# Patient Record
Sex: FEMALE | Race: ASIAN | Employment: UNEMPLOYED | URBAN - METROPOLITAN AREA
[De-identification: names, ages, dates, MRNs, and addresses within clinical notes are randomized per-mention and may not be internally consistent; named-entity substitution may affect disease eponyms.]

---

## 2024-07-07 ENCOUNTER — OFFICE VISIT (OUTPATIENT)
Dept: URGENT CARE | Facility: CLINIC | Age: 54
End: 2024-07-07
Payer: COMMERCIAL

## 2024-07-07 VITALS
DIASTOLIC BLOOD PRESSURE: 85 MMHG | OXYGEN SATURATION: 94 % | TEMPERATURE: 98.4 F | HEART RATE: 68 BPM | RESPIRATION RATE: 16 BRPM | SYSTOLIC BLOOD PRESSURE: 156 MMHG | WEIGHT: 143 LBS

## 2024-07-07 DIAGNOSIS — J02.9 ACUTE SORE THROAT: ICD-10-CM

## 2024-07-07 DIAGNOSIS — K12.0 APHTHOUS ULCER OF MOUTH: Primary | ICD-10-CM

## 2024-07-07 PROBLEM — R42 DIZZINESS: Status: ACTIVE | Noted: 2022-11-18

## 2024-07-07 PROBLEM — F41.9 ANXIETY: Status: ACTIVE | Noted: 2023-06-05

## 2024-07-07 PROBLEM — D18.01 HEMANGIOMA OF SKIN AND SUBCUTANEOUS TISSUE: Status: ACTIVE | Noted: 2021-08-31

## 2024-07-07 PROBLEM — E56.9 VITAMIN DEFICIENCY: Status: ACTIVE | Noted: 2021-08-04

## 2024-07-07 PROBLEM — M62.838 MUSCLE SPASM OF LEFT SHOULDER: Status: RESOLVED | Noted: 2024-07-07 | Resolved: 2024-07-07

## 2024-07-07 PROBLEM — M25.50 ARTHRALGIA: Status: ACTIVE | Noted: 2024-07-07

## 2024-07-07 PROBLEM — N90.7 LABIAL CYST: Status: RESOLVED | Noted: 2021-07-11 | Resolved: 2024-07-07

## 2024-07-07 PROBLEM — E03.9 HYPOTHYROIDISM: Status: ACTIVE | Noted: 2019-10-17

## 2024-07-07 PROBLEM — D22.5 MELANOCYTIC NEVI OF TRUNK: Status: ACTIVE | Noted: 2021-08-31

## 2024-07-07 PROBLEM — D48.5 NEOPLASM OF UNCERTAIN BEHAVIOR OF SKIN: Status: ACTIVE | Noted: 2021-08-31

## 2024-07-07 PROBLEM — G44.201 ACUTE INTRACTABLE TENSION-TYPE HEADACHE: Status: RESOLVED | Noted: 2024-07-07 | Resolved: 2024-07-07

## 2024-07-07 PROBLEM — R07.81 RIB PAIN: Status: ACTIVE | Noted: 2021-08-22

## 2024-07-07 PROBLEM — K21.9 GASTROESOPHAGEAL REFLUX DISEASE WITHOUT ESOPHAGITIS: Status: ACTIVE | Noted: 2018-01-03

## 2024-07-07 PROBLEM — M25.511 ACUTE PAIN OF RIGHT SHOULDER: Status: RESOLVED | Noted: 2021-08-22 | Resolved: 2024-07-07

## 2024-07-07 PROBLEM — M79.661 BILATERAL CALF PAIN: Status: ACTIVE | Noted: 2024-07-07

## 2024-07-07 PROBLEM — I10 ESSENTIAL HYPERTENSION: Status: ACTIVE | Noted: 2021-08-04

## 2024-07-07 PROBLEM — L82.1 OTHER SEBORRHEIC KERATOSIS: Status: ACTIVE | Noted: 2021-08-31

## 2024-07-07 PROBLEM — R59.9 REACTIVE LYMPHADENOPATHY: Status: ACTIVE | Noted: 2018-04-03
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
## 2024-07-07 PROBLEM — L81.4 OTHER MELANIN HYPERPIGMENTATION: Status: ACTIVE | Noted: 2021-08-31

## 2024-07-07 PROBLEM — M79.662 BILATERAL CALF PAIN: Status: ACTIVE | Noted: 2024-07-07

## 2024-07-07 PROCEDURE — 3079F DIAST BP 80-89 MM HG: CPT

## 2024-07-07 PROCEDURE — 3077F SYST BP >= 140 MM HG: CPT

## 2024-07-07 PROCEDURE — 99213 OFFICE O/P EST LOW 20 MIN: CPT

## 2024-07-07 RX ORDER — OMEPRAZOLE 20 MG/1
20 CAPSULE, DELAYED RELEASE ORAL
COMMUNITY

## 2024-07-07 RX ORDER — FERROUS SULFATE 325(65) MG
1 TABLET ORAL
COMMUNITY
Start: 2023-06-05

## 2024-07-07 RX ORDER — DICLOFENAC SODIUM 10 MG/G
2 GEL TOPICAL 2 TIMES DAILY
COMMUNITY
Start: 2023-06-05

## 2024-07-07 RX ORDER — CALCIUM CARBONATE 500(1250)
TABLET ORAL
COMMUNITY
Start: 2024-07-07

## 2024-07-07 RX ORDER — HYDROXYZINE HYDROCHLORIDE 25 MG/1
TABLET, FILM COATED ORAL
COMMUNITY
Start: 2023-03-23

## 2024-07-07 RX ORDER — LIDOCAINE HYDROCHLORIDE 20 MG/ML
SOLUTION OROPHARYNGEAL
Qty: 100 ML | Refills: 0 | Status: SHIPPED | OUTPATIENT
Start: 2024-07-07

## 2024-07-07 RX ORDER — DEXAMETHASONE 0.5 MG/5ML
ELIXIR ORAL
Qty: 100 ML | Refills: 0 | Status: SHIPPED | OUTPATIENT
Start: 2024-07-07

## 2024-07-07 RX ORDER — LOSARTAN POTASSIUM 50 MG/1
50 TABLET ORAL DAILY
COMMUNITY

## 2024-07-07 RX ORDER — TIZANIDINE 4 MG/1
TABLET ORAL EVERY 6 HOURS
COMMUNITY
Start: 2022-08-29

## 2024-07-07 RX ORDER — ESCITALOPRAM OXALATE 10 MG/1
10 TABLET ORAL NIGHTLY
COMMUNITY
Start: 2023-11-30

## 2024-07-07 RX ORDER — IBUPROFEN 200 MG
1 TABLET ORAL
COMMUNITY
Start: 2023-06-05

## 2024-07-07 RX ORDER — LEVOTHYROXINE SODIUM 50 UG/1
50 TABLET ORAL
COMMUNITY
Start: 2024-05-14 | End: 2025-05-14

## 2024-07-07 RX ORDER — CLOTRIMAZOLE 10 MG/1
LOZENGE ORAL
Qty: 50 TABLET | Refills: 0 | Status: SHIPPED | OUTPATIENT
Start: 2024-07-07

## 2024-07-07 RX ORDER — ERGOCALCIFEROL 1.25 MG/1
1 CAPSULE ORAL
COMMUNITY
Start: 2023-06-05

## 2024-07-07 RX ORDER — DEXTROMETHORPHAN HYDROBROMIDE, GUAIFENESIN 5; 100 MG/5ML; MG/5ML
LIQUID ORAL
COMMUNITY
Start: 2023-03-13

## 2024-07-07 RX ORDER — LOSARTAN POTASSIUM AND HYDROCHLOROTHIAZIDE 12.5; 5 MG/1; MG/1
1 TABLET ORAL
COMMUNITY
Start: 2024-05-14

## 2024-07-07 ASSESSMENT — ENCOUNTER SYMPTOMS
ADENOPATHY: 0
FEVER: 0
RHINORRHEA: 0
RESPIRATORY NEGATIVE: 1
NAUSEA: 0
HEADACHES: 0
FATIGUE: 0
DIARRHEA: 0
SORE THROAT: 1
FACIAL SWELLING: 0
TROUBLE SWALLOWING: 0
WEAKNESS: 0
ARTHRALGIAS: 0
DIZZINESS: 0
CONSTITUTIONAL NEGATIVE: 1
GASTROINTESTINAL NEGATIVE: 1
NEUROLOGICAL NEGATIVE: 1
ABDOMINAL PAIN: 0
CHILLS: 0
VOICE CHANGE: 0
PALPITATIONS: 0
COUGH: 0
DIAPHORESIS: 0
SHORTNESS OF BREATH: 0
MUSCULOSKELETAL NEGATIVE: 1
VOMITING: 0
MYALGIAS: 0
FACIAL ASYMMETRY: 0
CARDIOVASCULAR NEGATIVE: 1
NUMBNESS: 0

## 2024-07-07 ASSESSMENT — VISUAL ACUITY: OU: 1

## 2024-07-07 NOTE — PROGRESS NOTES
Subjective   History  Enrique Tamayo Rai is a 54 y.o. female who presents for Sore on Lip.    Patient presents with a painful sore on her inner lower lip causing for the last 5 days. She reports occasional dip tobacco use, but less than she used to in the past. No other changes in products like toothpastes. She has never had anything like this previously.      History provided by:  Patient and medical records    No past surgical history on file.  Social History     Tobacco Use    Smoking status: Former     Types: Cigarettes    Smokeless tobacco: Current     Types: Chew   Vaping Use    Vaping status: Never Used   Substance Use Topics    Alcohol use: Not on file    Drug use: Never       Review of Systems   Review of Systems   Constitutional: Negative.  Negative for chills, diaphoresis, fatigue and fever.   HENT:  Positive for mouth sores and sore throat. Negative for congestion, ear pain, facial swelling, rhinorrhea, trouble swallowing and voice change.    Respiratory: Negative.  Negative for cough and shortness of breath.    Cardiovascular: Negative.  Negative for chest pain and palpitations.   Gastrointestinal: Negative.  Negative for abdominal pain, diarrhea, nausea and vomiting.   Musculoskeletal: Negative.  Negative for arthralgias and myalgias.   Skin: Negative.  Negative for rash.   Neurological: Negative.  Negative for dizziness, facial asymmetry, weakness, numbness and headaches.   Hematological:  Negative for adenopathy.       Objective   Vital Signs  /85 (BP Location: Left arm, Patient Position: Sitting, BP Cuff Size: Adult)   Pulse 68   Temp 36.9 °C (98.4 °F)   Resp 16   Wt 64.9 kg (143 lb)   SpO2 94%    All vitals have been reviewed and are stable.     Physical Exam  Physical Exam  Vitals and nursing note reviewed.   Constitutional:       General: She is awake. She is not in acute distress.     Appearance: Normal appearance. She is well-developed. She is not ill-appearing, toxic-appearing or  diaphoretic.   HENT:      Head: Normocephalic and atraumatic. No right periorbital erythema or left periorbital erythema.      Jaw: There is normal jaw occlusion. No swelling.      Right Ear: External ear normal.      Left Ear: External ear normal.      Nose: Nose normal. No congestion or rhinorrhea.      Mouth/Throat:      Lips: Pink. No lesions.      Mouth: Mucous membranes are moist. Oral lesions (1.5cm white flat lesion on erythematous base - mid lower lip crease) present. No angioedema.      Tongue: Lesions (coating over posterior tongue) present. Tongue does not deviate from midline.      Palate: No lesions.      Pharynx: Oropharynx is clear. Uvula midline. Posterior oropharyngeal erythema present. No pharyngeal swelling, oropharyngeal exudate, uvula swelling or postnasal drip.      Tonsils: No tonsillar exudate.   Eyes:      General: Lids are normal. Vision grossly intact. Gaze aligned appropriately.      Extraocular Movements: Extraocular movements intact.      Conjunctiva/sclera: Conjunctivae normal.      Right eye: Right conjunctiva is not injected.      Left eye: Left conjunctiva is not injected.      Pupils: Pupils are equal, round, and reactive to light.   Cardiovascular:      Rate and Rhythm: Normal rate and regular rhythm.   Pulmonary:      Effort: Pulmonary effort is normal. No tachypnea or respiratory distress.      Breath sounds: Normal air entry. No stridor. No wheezing.   Abdominal:      General: Abdomen is flat. There is no distension.      Palpations: Abdomen is soft.   Musculoskeletal:         General: No swelling or deformity. Normal range of motion.      Cervical back: Full passive range of motion without pain, normal range of motion and neck supple.   Lymphadenopathy:      Head:      Right side of head: No submandibular or tonsillar adenopathy.      Left side of head: No submandibular or tonsillar adenopathy.   Skin:     General: Skin is warm and dry.      Findings: No erythema or rash.    Neurological:      General: No focal deficit present.      Mental Status: She is alert and oriented to person, place, and time. Mental status is at baseline.      Motor: Motor function is intact.      Coordination: Coordination is intact.   Psychiatric:         Mood and Affect: Mood and affect normal.         Speech: Speech normal.         Behavior: Behavior normal. Behavior is cooperative.         Thought Content: Thought content normal.         Judgment: Judgment normal.         Diagnostic Results   No results found for this or any previous visit (from the past 48 hour(s)).    Assessment/Plan   Procedures   N/A    Problem List Items Addressed This Visit    None  Visit Diagnoses       Aphthous ulcer of mouth    -  Primary    Relevant Medications    dexAMETHasone 0.5 mg/5 mL oral liquid    lidocaine (Xylocaine) 2 % solution    clotrimazole (Mycelex) 10 mg conrad    Acute sore throat        Relevant Medications    clotrimazole (Mycelex) 10 mg conrad            UC Course  MDM    Patient disposition: Home    Red flags for reporting to ER have been reviewed with the patient.    Current diagnosis, any medication changes, and all in-office lab or radiologic results have been reviewed with the patient at the time of the visit.   If symptoms do not improve or worsen, patient is to follow up with PCP or report to the emergency room.   Patient is alert and oriented x3 and non-toxic appearing. Vital signs are stable.   Patient and/or guardian has sufficient decision-making capabilities at this time and reports understanding and agreement with the treatment plan made through shared decision-making.

## 2024-07-07 NOTE — PATIENT INSTRUCTIONS
APHTHOUS ULCERS (CANKER SORES)     - 2% Viscous LIDOCAINE had been prescribed for topical numbing / pain relief - do not swallow   - DEXAMETHASONE oral steroid elixir has been prescribed for treatment of the ulcer - swish and spit 3-4 times daily. Avoid eating or drinking anything for 30 minutes after use.   - CLOTRIMAZOLE oral troches were prescribed for treatment of excess oral yeast     Canker sores have been found to be associated with toothpastes containing SLS (Sodium Pam Sulfate) and Vitmain B12 or Folate deficiencies.   Recommend to try toothpastes without SLS and supplementing with a multivitamin or B-complex.   - Recommended discontinuing all tobacco products    If you get frequent/recurrent canker sores, especially if you have a history of intestinal discomfort (stomach pain, diarrhea, bloody stools), follow-up with your PCP for evaluation for a possible chronic condition.